# Patient Record
Sex: MALE | Race: BLACK OR AFRICAN AMERICAN | NOT HISPANIC OR LATINO | URBAN - METROPOLITAN AREA
[De-identification: names, ages, dates, MRNs, and addresses within clinical notes are randomized per-mention and may not be internally consistent; named-entity substitution may affect disease eponyms.]

---

## 2017-05-22 ENCOUNTER — INPATIENT (INPATIENT)
Facility: HOSPITAL | Age: 67
LOS: 2 days | Discharge: HOME | DRG: 313 | End: 2017-05-25
Attending: INTERNAL MEDICINE | Admitting: INTERNAL MEDICINE
Payer: COMMERCIAL

## 2017-05-22 VITALS
OXYGEN SATURATION: 99 % | TEMPERATURE: 98 F | HEART RATE: 68 BPM | DIASTOLIC BLOOD PRESSURE: 93 MMHG | SYSTOLIC BLOOD PRESSURE: 174 MMHG | RESPIRATION RATE: 18 BRPM

## 2017-05-22 DIAGNOSIS — R07.9 CHEST PAIN, UNSPECIFIED: ICD-10-CM

## 2017-05-22 LAB
ALBUMIN SERPL ELPH-MCNC: 4.2 G/DL — SIGNIFICANT CHANGE UP (ref 3.3–5)
ALP SERPL-CCNC: 77 U/L — SIGNIFICANT CHANGE UP (ref 40–120)
ALT FLD-CCNC: 15 U/L RC — SIGNIFICANT CHANGE UP (ref 10–45)
ANION GAP SERPL CALC-SCNC: 13 MMOL/L — SIGNIFICANT CHANGE UP (ref 5–17)
APTT BLD: 34.6 SEC — SIGNIFICANT CHANGE UP (ref 27.5–37.4)
AST SERPL-CCNC: 21 U/L — SIGNIFICANT CHANGE UP (ref 10–40)
BASOPHILS # BLD AUTO: 0 K/UL — SIGNIFICANT CHANGE UP (ref 0–0.2)
BILIRUB SERPL-MCNC: 0.9 MG/DL — SIGNIFICANT CHANGE UP (ref 0.2–1.2)
BUN SERPL-MCNC: 15 MG/DL — SIGNIFICANT CHANGE UP (ref 7–23)
CALCIUM SERPL-MCNC: 9.1 MG/DL — SIGNIFICANT CHANGE UP (ref 8.4–10.5)
CHLORIDE SERPL-SCNC: 102 MMOL/L — SIGNIFICANT CHANGE UP (ref 96–108)
CK MB BLD-MCNC: 0.6 % — SIGNIFICANT CHANGE UP (ref 0–3.5)
CK MB CFR SERPL CALC: 2.5 NG/ML — SIGNIFICANT CHANGE UP (ref 0–6.7)
CK SERPL-CCNC: 408 U/L — HIGH (ref 30–200)
CO2 SERPL-SCNC: 26 MMOL/L — SIGNIFICANT CHANGE UP (ref 22–31)
CREAT SERPL-MCNC: 1.16 MG/DL — SIGNIFICANT CHANGE UP (ref 0.5–1.3)
EOSINOPHIL # BLD AUTO: 0.3 K/UL — SIGNIFICANT CHANGE UP (ref 0–0.5)
GAS PNL BLDV: SIGNIFICANT CHANGE UP
GLUCOSE SERPL-MCNC: 98 MG/DL — SIGNIFICANT CHANGE UP (ref 70–99)
HCT VFR BLD CALC: 44 % — SIGNIFICANT CHANGE UP (ref 39–50)
HGB BLD-MCNC: 14.4 G/DL — SIGNIFICANT CHANGE UP (ref 13–17)
INR BLD: 1 RATIO — SIGNIFICANT CHANGE UP (ref 0.88–1.16)
LYMPHOCYTES # BLD AUTO: 2.2 K/UL — SIGNIFICANT CHANGE UP (ref 1–3.3)
LYMPHOCYTES # BLD AUTO: 33 % — SIGNIFICANT CHANGE UP (ref 13–44)
MAGNESIUM SERPL-MCNC: 2.1 MG/DL — SIGNIFICANT CHANGE UP (ref 1.6–2.6)
MCHC RBC-ENTMCNC: 29.7 PG — SIGNIFICANT CHANGE UP (ref 27–34)
MCHC RBC-ENTMCNC: 32.8 GM/DL — SIGNIFICANT CHANGE UP (ref 32–36)
MCV RBC AUTO: 90.5 FL — SIGNIFICANT CHANGE UP (ref 80–100)
MONOCYTES # BLD AUTO: 0.5 K/UL — SIGNIFICANT CHANGE UP (ref 0–0.9)
MONOCYTES NFR BLD AUTO: 9 % — SIGNIFICANT CHANGE UP (ref 2–14)
NEUTROPHILS # BLD AUTO: 1.6 K/UL — LOW (ref 1.8–7.4)
NEUTROPHILS NFR BLD AUTO: 58 % — SIGNIFICANT CHANGE UP (ref 43–77)
NT-PROBNP SERPL-SCNC: 51 PG/ML — SIGNIFICANT CHANGE UP (ref 0–300)
PHOSPHATE SERPL-MCNC: 3.3 MG/DL — SIGNIFICANT CHANGE UP (ref 2.5–4.5)
PLATELET # BLD AUTO: 212 K/UL — SIGNIFICANT CHANGE UP (ref 150–400)
POTASSIUM SERPL-MCNC: 4.1 MMOL/L — SIGNIFICANT CHANGE UP (ref 3.5–5.3)
POTASSIUM SERPL-SCNC: 4.1 MMOL/L — SIGNIFICANT CHANGE UP (ref 3.5–5.3)
PROT SERPL-MCNC: 7.7 G/DL — SIGNIFICANT CHANGE UP (ref 6–8.3)
PROTHROM AB SERPL-ACNC: 10.9 SEC — SIGNIFICANT CHANGE UP (ref 9.8–12.7)
RBC # BLD: 4.86 M/UL — SIGNIFICANT CHANGE UP (ref 4.2–5.8)
RBC # FLD: 12 % — SIGNIFICANT CHANGE UP (ref 10.3–14.5)
SODIUM SERPL-SCNC: 141 MMOL/L — SIGNIFICANT CHANGE UP (ref 135–145)
TROPONIN T SERPL-MCNC: <0.01 NG/ML — SIGNIFICANT CHANGE UP (ref 0–0.06)
WBC # BLD: 5.1 K/UL — SIGNIFICANT CHANGE UP (ref 3.8–10.5)
WBC # FLD AUTO: 5.1 K/UL — SIGNIFICANT CHANGE UP (ref 3.8–10.5)

## 2017-05-22 PROCEDURE — 71010: CPT | Mod: 26

## 2017-05-22 PROCEDURE — 99285 EMERGENCY DEPT VISIT HI MDM: CPT

## 2017-05-22 RX ORDER — ASPIRIN/CALCIUM CARB/MAGNESIUM 324 MG
324 TABLET ORAL ONCE
Qty: 0 | Refills: 0 | Status: COMPLETED | OUTPATIENT
Start: 2017-05-22 | End: 2017-05-22

## 2017-05-22 RX ADMIN — Medication 324 MILLIGRAM(S): at 21:00

## 2017-05-22 NOTE — ED PROVIDER NOTE - ATTENDING CONTRIBUTION TO CARE
Attending MD Burnett: I personally have seen and examined this patient.  Resident note reviewed and agree on plan of care and except where noted.  See below for details.     66M with PMH including HTN, HLD, colitis presents to the ED with chest pain.  Reports pain began about an hour prior to arrival and woke him from sleep.  Reports associated diaphoresis and mild shortness of breath.  Reports that he had been told he had an "abnormal EKG" last Friday by cardiologist and was sent for a stress which he has not had yet (scheduled this week).  Reports he woke up in a cold sweat.  Denies abdominal pain, nausea, vomiting, diarrhea, blood in stools. Denies dysuria, hematuria, change in urinary habits including frequency, urgency. Denies fevers, chills, dizziness, weakness, change in vision. On exam, head NCAT, PERRL, FROM at neck, no tenderness to palpation or stepoffs along length of spine, lungs CTAB with good inspiratory effort, +S1S2, no m/r/g, abdomen soft with +BS, NT, ND, no CVAT, moving all extremities with 5/5 strength bilateral upper and lower extremities, good and equal  strength bilaterally; Plan: Labs, EKG, CXR, admit

## 2017-05-22 NOTE — ED PROVIDER NOTE - OBJECTIVE STATEMENT
66yM h/o HTN, former smoker presents with 1 hour of substernal chest pressure, began while he was asleep, pain awoke him. Associated with diaphoresis and mild SOB. Not on daily ASA, did not take ASA prior to arrival. Last stress test >10 years ago. Reports seeing PMD last week for routine visit where reportedly had abnormal EKG, sent to cardiology follow-up (cannot recall name of cardiologist, not affiliated with [facility]) and recommended to schedule outpatient stress test this week.  PMD Javier Bose

## 2017-05-22 NOTE — ED ADULT NURSE NOTE - OBJECTIVE STATEMENT
Received patient awake and alert x 3, patient presents with non-radiating chest pain starting today, verbalized pain 8/10 on pain scale. Denies nausea, vomiting, dizziness. Per patient was awaken up by SOB and CP. Wife at bedside. Questions and concerns addressed, safety precaution maintained, will continue to monitor.

## 2017-05-23 DIAGNOSIS — R07.9 CHEST PAIN, UNSPECIFIED: ICD-10-CM

## 2017-05-23 DIAGNOSIS — I82.409 ACUTE EMBOLISM AND THROMBOSIS OF UNSPECIFIED DEEP VEINS OF UNSPECIFIED LOWER EXTREMITY: ICD-10-CM

## 2017-05-23 DIAGNOSIS — M67.911 UNSPECIFIED DISORDER OF SYNOVIUM AND TENDON, RIGHT SHOULDER: Chronic | ICD-10-CM

## 2017-05-23 DIAGNOSIS — I26.99 OTHER PULMONARY EMBOLISM WITHOUT ACUTE COR PULMONALE: ICD-10-CM

## 2017-05-23 DIAGNOSIS — Z98.890 OTHER SPECIFIED POSTPROCEDURAL STATES: Chronic | ICD-10-CM

## 2017-05-23 DIAGNOSIS — K52.9 NONINFECTIVE GASTROENTERITIS AND COLITIS, UNSPECIFIED: ICD-10-CM

## 2017-05-23 DIAGNOSIS — I10 ESSENTIAL (PRIMARY) HYPERTENSION: ICD-10-CM

## 2017-05-23 DIAGNOSIS — E78.5 HYPERLIPIDEMIA, UNSPECIFIED: ICD-10-CM

## 2017-05-23 LAB
ALBUMIN SERPL ELPH-MCNC: 3.6 G/DL — SIGNIFICANT CHANGE UP (ref 3.3–5)
ALP SERPL-CCNC: 64 U/L — SIGNIFICANT CHANGE UP (ref 40–120)
ALT FLD-CCNC: 14 U/L — SIGNIFICANT CHANGE UP (ref 10–45)
ANION GAP SERPL CALC-SCNC: 13 MMOL/L — SIGNIFICANT CHANGE UP (ref 5–17)
AST SERPL-CCNC: 23 U/L — SIGNIFICANT CHANGE UP (ref 10–40)
BASOPHILS # BLD AUTO: 0.01 K/UL — SIGNIFICANT CHANGE UP (ref 0–0.2)
BASOPHILS NFR BLD AUTO: 0.2 % — SIGNIFICANT CHANGE UP (ref 0–2)
BILIRUB SERPL-MCNC: 1 MG/DL — SIGNIFICANT CHANGE UP (ref 0.2–1.2)
BUN SERPL-MCNC: 10 MG/DL — SIGNIFICANT CHANGE UP (ref 7–23)
CALCIUM SERPL-MCNC: 9 MG/DL — SIGNIFICANT CHANGE UP (ref 8.4–10.5)
CHLORIDE SERPL-SCNC: 101 MMOL/L — SIGNIFICANT CHANGE UP (ref 96–108)
CHOLEST SERPL-MCNC: 154 MG/DL — SIGNIFICANT CHANGE UP (ref 10–199)
CK MB CFR SERPL CALC: 2.1 NG/ML — SIGNIFICANT CHANGE UP (ref 0–6.7)
CK SERPL-CCNC: 315 U/L — HIGH (ref 30–200)
CO2 SERPL-SCNC: 24 MMOL/L — SIGNIFICANT CHANGE UP (ref 22–31)
CREAT SERPL-MCNC: 1.23 MG/DL — SIGNIFICANT CHANGE UP (ref 0.5–1.3)
EOSINOPHIL # BLD AUTO: 0.26 K/UL — SIGNIFICANT CHANGE UP (ref 0–0.5)
EOSINOPHIL NFR BLD AUTO: 6.4 % — HIGH (ref 0–6)
GLUCOSE SERPL-MCNC: 91 MG/DL — SIGNIFICANT CHANGE UP (ref 70–99)
HBA1C BLD-MCNC: 6 % — HIGH (ref 4–5.6)
HCT VFR BLD CALC: 42 % — SIGNIFICANT CHANGE UP (ref 39–50)
HDLC SERPL-MCNC: 54 MG/DL — SIGNIFICANT CHANGE UP (ref 40–125)
HGB BLD-MCNC: 13.3 G/DL — SIGNIFICANT CHANGE UP (ref 13–17)
IMM GRANULOCYTES NFR BLD AUTO: 0 % — SIGNIFICANT CHANGE UP (ref 0–1.5)
LIPID PNL WITH DIRECT LDL SERPL: 83 MG/DL — SIGNIFICANT CHANGE UP
LYMPHOCYTES # BLD AUTO: 2.1 K/UL — SIGNIFICANT CHANGE UP (ref 1–3.3)
LYMPHOCYTES # BLD AUTO: 51.5 % — HIGH (ref 13–44)
MCHC RBC-ENTMCNC: 27.5 PG — SIGNIFICANT CHANGE UP (ref 27–34)
MCHC RBC-ENTMCNC: 31.7 GM/DL — LOW (ref 32–36)
MCV RBC AUTO: 86.8 FL — SIGNIFICANT CHANGE UP (ref 80–100)
MONOCYTES # BLD AUTO: 0.36 K/UL — SIGNIFICANT CHANGE UP (ref 0–0.9)
MONOCYTES NFR BLD AUTO: 8.8 % — SIGNIFICANT CHANGE UP (ref 2–14)
NEUTROPHILS # BLD AUTO: 1.35 K/UL — LOW (ref 1.8–7.4)
NEUTROPHILS NFR BLD AUTO: 33.1 % — LOW (ref 43–77)
PLATELET # BLD AUTO: 228 K/UL — SIGNIFICANT CHANGE UP (ref 150–400)
POTASSIUM SERPL-MCNC: 3.6 MMOL/L — SIGNIFICANT CHANGE UP (ref 3.5–5.3)
POTASSIUM SERPL-SCNC: 3.6 MMOL/L — SIGNIFICANT CHANGE UP (ref 3.5–5.3)
PROT SERPL-MCNC: 6.7 G/DL — SIGNIFICANT CHANGE UP (ref 6–8.3)
RBC # BLD: 4.84 M/UL — SIGNIFICANT CHANGE UP (ref 4.2–5.8)
RBC # FLD: 13.6 % — SIGNIFICANT CHANGE UP (ref 10.3–14.5)
SODIUM SERPL-SCNC: 138 MMOL/L — SIGNIFICANT CHANGE UP (ref 135–145)
TOTAL CHOLESTEROL/HDL RATIO MEASUREMENT: 2.9 RATIO — LOW (ref 3.4–9.6)
TRIGL SERPL-MCNC: 84 MG/DL — SIGNIFICANT CHANGE UP (ref 10–149)
TROPONIN T SERPL-MCNC: <0.01 NG/ML — SIGNIFICANT CHANGE UP (ref 0–0.06)
WBC # BLD: 4.08 K/UL — SIGNIFICANT CHANGE UP (ref 3.8–10.5)
WBC # FLD AUTO: 4.08 K/UL — SIGNIFICANT CHANGE UP (ref 3.8–10.5)

## 2017-05-23 PROCEDURE — 99223 1ST HOSP IP/OBS HIGH 75: CPT | Mod: AI

## 2017-05-23 RX ORDER — LORATADINE 10 MG/1
10 TABLET ORAL DAILY
Qty: 0 | Refills: 0 | Status: DISCONTINUED | OUTPATIENT
Start: 2017-05-23 | End: 2017-05-25

## 2017-05-23 RX ORDER — MESALAMINE 400 MG
2.4 TABLET, DELAYED RELEASE (ENTERIC COATED) ORAL DAILY
Qty: 0 | Refills: 0 | Status: DISCONTINUED | OUTPATIENT
Start: 2017-05-23 | End: 2017-05-25

## 2017-05-23 RX ORDER — ACETAMINOPHEN 500 MG
650 TABLET ORAL EVERY 6 HOURS
Qty: 0 | Refills: 0 | Status: DISCONTINUED | OUTPATIENT
Start: 2017-05-23 | End: 2017-05-25

## 2017-05-23 RX ORDER — DIPHENHYDRAMINE HCL 50 MG
25 CAPSULE ORAL ONCE
Qty: 0 | Refills: 0 | Status: COMPLETED | OUTPATIENT
Start: 2017-05-23 | End: 2017-05-23

## 2017-05-23 RX ORDER — CLOTRIMAZOLE AND BETAMETHASONE DIPROPIONATE 10; .5 MG/G; MG/G
1 CREAM TOPICAL
Qty: 0 | Refills: 0 | Status: DISCONTINUED | OUTPATIENT
Start: 2017-05-23 | End: 2017-05-23

## 2017-05-23 RX ORDER — AMLODIPINE BESYLATE 2.5 MG/1
10 TABLET ORAL DAILY
Qty: 0 | Refills: 0 | Status: DISCONTINUED | OUTPATIENT
Start: 2017-05-23 | End: 2017-05-25

## 2017-05-23 RX ORDER — CLOTRIMAZOLE AND BETAMETHASONE DIPROPIONATE 10; .5 MG/G; MG/G
1 CREAM TOPICAL
Qty: 0 | Refills: 0 | Status: DISCONTINUED | OUTPATIENT
Start: 2017-05-23 | End: 2017-05-25

## 2017-05-23 RX ORDER — ENOXAPARIN SODIUM 100 MG/ML
40 INJECTION SUBCUTANEOUS DAILY
Qty: 0 | Refills: 0 | Status: DISCONTINUED | OUTPATIENT
Start: 2017-05-23 | End: 2017-05-25

## 2017-05-23 RX ORDER — ATORVASTATIN CALCIUM 80 MG/1
40 TABLET, FILM COATED ORAL AT BEDTIME
Qty: 0 | Refills: 0 | Status: DISCONTINUED | OUTPATIENT
Start: 2017-05-23 | End: 2017-05-25

## 2017-05-23 RX ADMIN — Medication 25 MILLIGRAM(S): at 11:31

## 2017-05-23 RX ADMIN — ENOXAPARIN SODIUM 40 MILLIGRAM(S): 100 INJECTION SUBCUTANEOUS at 17:55

## 2017-05-23 RX ADMIN — AMLODIPINE BESYLATE 10 MILLIGRAM(S): 2.5 TABLET ORAL at 05:20

## 2017-05-23 RX ADMIN — Medication 2.4 GRAM(S): at 12:44

## 2017-05-23 RX ADMIN — LORATADINE 10 MILLIGRAM(S): 10 TABLET ORAL at 17:55

## 2017-05-23 RX ADMIN — CLOTRIMAZOLE AND BETAMETHASONE DIPROPIONATE 1 APPLICATION(S): 10; .5 CREAM TOPICAL at 12:50

## 2017-05-23 RX ADMIN — ATORVASTATIN CALCIUM 40 MILLIGRAM(S): 80 TABLET, FILM COATED ORAL at 21:22

## 2017-05-23 NOTE — H&P ADULT. - MUSCULOSKELETAL
details… detailed exam ROM intact/no joint swelling/no calf tenderness/no joint erythema/no joint warmth

## 2017-05-23 NOTE — H&P ADULT. - ASSESSMENT
66M w/pmh  HTN, HLD, Colitis,  former smoker,  p/w chest pain x one day. Negative cardiac enzymes, CPK elevated ,  CXR clear , EKG with nonspecific twave changes.

## 2017-05-23 NOTE — H&P ADULT. - FAMILY HISTORY
Father  Still living? Unknown  Family history of essential hypertension, Age at diagnosis: Age Unknown     Mother  Still living? Unknown  Family history of essential hypertension, Age at diagnosis: Age Unknown

## 2017-05-23 NOTE — H&P ADULT. - PROBLEM SELECTOR PLAN 1
will risk stratify , monitor enzymes and observe on telemetry   - monitor telemetry   - f/u A1c , lipid profile   - f/u serial cardiac enzymes   - continue ASA   -inpatient or next day stress if can be obtained

## 2017-05-23 NOTE — H&P ADULT. - HISTORY OF PRESENT ILLNESS
Patient is a 66 year old male with a past medical history significant for HTN, HLD, Colitis,  former smoker presents with 1 hour of substernal chest pain.   Patient reports he woke up from sleep with midsternal, constant, non radiating 8/10, pressure like chest pain . He reports associated diaphoresis and mild SOB. His symptoms did not worsen with exertion, and  improved after receiving aspirin in the emergency room. He report a recent runny nose, no cough and no fevers.  He reports intermittent lip swelling in the mornings for which he was seen recently by his PMD, and his losartan was discontinued. During that visit his EKG showed t wave changes for which he was seen by cardiology and referred to obtain a stress test which has not yet been scheduled.  He reports no palpitations, no leg edema, has normal exercise tolerance no GRIFFITHS  and no PND or orthopnea.  He has no recent travel and no sick contacts, no family history of cardiac disease.

## 2017-05-23 NOTE — H&P ADULT. - NEGATIVE CARDIOVASCULAR SYMPTOMS
no palpitations/no peripheral edema/no orthopnea/no claudication/no dyspnea on exertion/no paroxysmal nocturnal dyspnea

## 2017-05-24 LAB
ANION GAP SERPL CALC-SCNC: 12 MMOL/L — SIGNIFICANT CHANGE UP (ref 5–17)
BUN SERPL-MCNC: 14 MG/DL — SIGNIFICANT CHANGE UP (ref 7–23)
CALCIUM SERPL-MCNC: 9.3 MG/DL — SIGNIFICANT CHANGE UP (ref 8.4–10.5)
CHLORIDE SERPL-SCNC: 103 MMOL/L — SIGNIFICANT CHANGE UP (ref 96–108)
CO2 SERPL-SCNC: 26 MMOL/L — SIGNIFICANT CHANGE UP (ref 22–31)
CREAT SERPL-MCNC: 1.07 MG/DL — SIGNIFICANT CHANGE UP (ref 0.5–1.3)
D DIMER BLD IA.RAPID-MCNC: 433 NG/ML DDU — HIGH
GLUCOSE SERPL-MCNC: 91 MG/DL — SIGNIFICANT CHANGE UP (ref 70–99)
HCT VFR BLD CALC: 43.8 % — SIGNIFICANT CHANGE UP (ref 39–50)
HGB BLD-MCNC: 14.4 G/DL — SIGNIFICANT CHANGE UP (ref 13–17)
MCHC RBC-ENTMCNC: 29.5 PG — SIGNIFICANT CHANGE UP (ref 27–34)
MCHC RBC-ENTMCNC: 32.9 GM/DL — SIGNIFICANT CHANGE UP (ref 32–36)
MCV RBC AUTO: 89.6 FL — SIGNIFICANT CHANGE UP (ref 80–100)
PLATELET # BLD AUTO: 199 K/UL — SIGNIFICANT CHANGE UP (ref 150–400)
POTASSIUM SERPL-MCNC: 3.7 MMOL/L — SIGNIFICANT CHANGE UP (ref 3.5–5.3)
POTASSIUM SERPL-SCNC: 3.7 MMOL/L — SIGNIFICANT CHANGE UP (ref 3.5–5.3)
RBC # BLD: 4.88 M/UL — SIGNIFICANT CHANGE UP (ref 4.2–5.8)
RBC # FLD: 12 % — SIGNIFICANT CHANGE UP (ref 10.3–14.5)
SODIUM SERPL-SCNC: 141 MMOL/L — SIGNIFICANT CHANGE UP (ref 135–145)
WBC # BLD: 4 K/UL — SIGNIFICANT CHANGE UP (ref 3.8–10.5)
WBC # FLD AUTO: 4 K/UL — SIGNIFICANT CHANGE UP (ref 3.8–10.5)

## 2017-05-24 PROCEDURE — 71275 CT ANGIOGRAPHY CHEST: CPT | Mod: 26

## 2017-05-24 PROCEDURE — 93970 EXTREMITY STUDY: CPT | Mod: 26

## 2017-05-24 RX ADMIN — AMLODIPINE BESYLATE 10 MILLIGRAM(S): 2.5 TABLET ORAL at 05:57

## 2017-05-24 RX ADMIN — LORATADINE 10 MILLIGRAM(S): 10 TABLET ORAL at 14:23

## 2017-05-24 RX ADMIN — Medication 2.4 GRAM(S): at 14:23

## 2017-05-24 RX ADMIN — ATORVASTATIN CALCIUM 40 MILLIGRAM(S): 80 TABLET, FILM COATED ORAL at 21:42

## 2017-05-24 RX ADMIN — ENOXAPARIN SODIUM 40 MILLIGRAM(S): 100 INJECTION SUBCUTANEOUS at 14:23

## 2017-05-25 VITALS
OXYGEN SATURATION: 96 % | HEART RATE: 53 BPM | DIASTOLIC BLOOD PRESSURE: 84 MMHG | RESPIRATION RATE: 18 BRPM | SYSTOLIC BLOOD PRESSURE: 138 MMHG

## 2017-05-25 LAB
ANION GAP SERPL CALC-SCNC: 12 MMOL/L — SIGNIFICANT CHANGE UP (ref 5–17)
BUN SERPL-MCNC: 15 MG/DL — SIGNIFICANT CHANGE UP (ref 7–23)
CALCIUM SERPL-MCNC: 9.3 MG/DL — SIGNIFICANT CHANGE UP (ref 8.4–10.5)
CHLORIDE SERPL-SCNC: 100 MMOL/L — SIGNIFICANT CHANGE UP (ref 96–108)
CO2 SERPL-SCNC: 29 MMOL/L — SIGNIFICANT CHANGE UP (ref 22–31)
CREAT SERPL-MCNC: 1.17 MG/DL — SIGNIFICANT CHANGE UP (ref 0.5–1.3)
GLUCOSE SERPL-MCNC: 95 MG/DL — SIGNIFICANT CHANGE UP (ref 70–99)
POTASSIUM SERPL-MCNC: 3.5 MMOL/L — SIGNIFICANT CHANGE UP (ref 3.5–5.3)
POTASSIUM SERPL-SCNC: 3.5 MMOL/L — SIGNIFICANT CHANGE UP (ref 3.5–5.3)
SODIUM SERPL-SCNC: 141 MMOL/L — SIGNIFICANT CHANGE UP (ref 135–145)

## 2017-05-25 PROCEDURE — 93970 EXTREMITY STUDY: CPT

## 2017-05-25 PROCEDURE — 84132 ASSAY OF SERUM POTASSIUM: CPT

## 2017-05-25 PROCEDURE — 84295 ASSAY OF SERUM SODIUM: CPT

## 2017-05-25 PROCEDURE — 85730 THROMBOPLASTIN TIME PARTIAL: CPT

## 2017-05-25 PROCEDURE — 85027 COMPLETE CBC AUTOMATED: CPT

## 2017-05-25 PROCEDURE — 82330 ASSAY OF CALCIUM: CPT

## 2017-05-25 PROCEDURE — 85014 HEMATOCRIT: CPT

## 2017-05-25 PROCEDURE — 82803 BLOOD GASES ANY COMBINATION: CPT

## 2017-05-25 PROCEDURE — 83735 ASSAY OF MAGNESIUM: CPT

## 2017-05-25 PROCEDURE — 78452 HT MUSCLE IMAGE SPECT MULT: CPT | Mod: 26

## 2017-05-25 PROCEDURE — 80061 LIPID PANEL: CPT

## 2017-05-25 PROCEDURE — 82550 ASSAY OF CK (CPK): CPT

## 2017-05-25 PROCEDURE — 78452 HT MUSCLE IMAGE SPECT MULT: CPT

## 2017-05-25 PROCEDURE — 93018 CV STRESS TEST I&R ONLY: CPT

## 2017-05-25 PROCEDURE — 99285 EMERGENCY DEPT VISIT HI MDM: CPT | Mod: 25

## 2017-05-25 PROCEDURE — 93017 CV STRESS TEST TRACING ONLY: CPT

## 2017-05-25 PROCEDURE — 82435 ASSAY OF BLOOD CHLORIDE: CPT

## 2017-05-25 PROCEDURE — 80048 BASIC METABOLIC PNL TOTAL CA: CPT

## 2017-05-25 PROCEDURE — A9500: CPT

## 2017-05-25 PROCEDURE — 71275 CT ANGIOGRAPHY CHEST: CPT

## 2017-05-25 PROCEDURE — 82947 ASSAY GLUCOSE BLOOD QUANT: CPT

## 2017-05-25 PROCEDURE — 93016 CV STRESS TEST SUPVJ ONLY: CPT

## 2017-05-25 PROCEDURE — 83880 ASSAY OF NATRIURETIC PEPTIDE: CPT

## 2017-05-25 PROCEDURE — 83605 ASSAY OF LACTIC ACID: CPT

## 2017-05-25 PROCEDURE — 82553 CREATINE MB FRACTION: CPT

## 2017-05-25 PROCEDURE — 71045 X-RAY EXAM CHEST 1 VIEW: CPT

## 2017-05-25 PROCEDURE — 84100 ASSAY OF PHOSPHORUS: CPT

## 2017-05-25 PROCEDURE — 80053 COMPREHEN METABOLIC PANEL: CPT

## 2017-05-25 PROCEDURE — 85379 FIBRIN DEGRADATION QUANT: CPT

## 2017-05-25 PROCEDURE — 84484 ASSAY OF TROPONIN QUANT: CPT

## 2017-05-25 PROCEDURE — 85610 PROTHROMBIN TIME: CPT

## 2017-05-25 PROCEDURE — 83036 HEMOGLOBIN GLYCOSYLATED A1C: CPT

## 2017-05-25 RX ORDER — METOPROLOL TARTRATE 50 MG
1 TABLET ORAL
Qty: 30 | Refills: 0 | OUTPATIENT
Start: 2017-05-25 | End: 2017-06-24

## 2017-05-25 RX ORDER — CLOTRIMAZOLE AND BETAMETHASONE DIPROPIONATE 10; .5 MG/G; MG/G
1 CREAM TOPICAL
Qty: 0 | Refills: 0 | COMMUNITY
Start: 2017-05-25

## 2017-05-25 RX ORDER — MESALAMINE 400 MG
2 TABLET, DELAYED RELEASE (ENTERIC COATED) ORAL
Qty: 0 | Refills: 0 | COMMUNITY
Start: 2017-05-25

## 2017-05-25 RX ORDER — AMLODIPINE BESYLATE 2.5 MG/1
1 TABLET ORAL
Qty: 0 | Refills: 0 | COMMUNITY
Start: 2017-05-25

## 2017-05-25 RX ORDER — ASPIRIN/CALCIUM CARB/MAGNESIUM 324 MG
81 TABLET ORAL DAILY
Qty: 0 | Refills: 0 | Status: DISCONTINUED | OUTPATIENT
Start: 2017-05-25 | End: 2017-05-25

## 2017-05-25 RX ORDER — ASPIRIN/CALCIUM CARB/MAGNESIUM 324 MG
1 TABLET ORAL
Qty: 30 | Refills: 0 | OUTPATIENT
Start: 2017-05-25 | End: 2017-06-24

## 2017-05-25 RX ORDER — METOPROLOL TARTRATE 50 MG
25 TABLET ORAL DAILY
Qty: 0 | Refills: 0 | Status: DISCONTINUED | OUTPATIENT
Start: 2017-05-25 | End: 2017-05-25

## 2017-05-25 RX ORDER — ATORVASTATIN CALCIUM 80 MG/1
1 TABLET, FILM COATED ORAL
Qty: 0 | Refills: 0 | COMMUNITY
Start: 2017-05-25

## 2017-05-25 RX ADMIN — ENOXAPARIN SODIUM 40 MILLIGRAM(S): 100 INJECTION SUBCUTANEOUS at 15:06

## 2017-05-25 RX ADMIN — AMLODIPINE BESYLATE 10 MILLIGRAM(S): 2.5 TABLET ORAL at 06:00

## 2017-05-25 RX ADMIN — LORATADINE 10 MILLIGRAM(S): 10 TABLET ORAL at 15:06

## 2017-05-25 RX ADMIN — Medication 2.4 GRAM(S): at 15:06

## 2017-05-25 RX ADMIN — Medication 25 MILLIGRAM(S): at 18:32

## 2017-05-25 RX ADMIN — Medication 81 MILLIGRAM(S): at 18:39

## 2017-05-25 NOTE — DISCHARGE NOTE ADULT - CARE PROVIDER_API CALL
Jonas Ryder (DO), Internal Medicine  [address] [place] [address]  Phone: [phone]    Javire Carmona  Phone: (   )    -  Fax: (   )    -

## 2017-05-25 NOTE — DISCHARGE NOTE ADULT - PATIENT PORTAL LINK FT
“You can access the Follow[employer] Patient Portal, offered by [facility], by registering with the following website: [email or web address]”

## 2017-05-25 NOTE — DISCHARGE NOTE ADULT - PROVIDER TOKENS
TOKEN:'[id number]',FREE:[LAST:[DR Alexandra],FIRST:[Javier],PHONE:[(   )    -],FAX:[(   )    -],ADDRESS:[PCP]]

## 2017-05-25 NOTE — DISCHARGE NOTE ADULT - CARE PLAN
Principal Discharge DX:	Atypical chest pain  Secondary Diagnosis:	HTN (hypertension)  Secondary Diagnosis:	Colitis Principal Discharge DX:	Atypical chest pain  Goal:	symptoms resolved  Instructions for follow-up, activity and diet:	HOME CARE INSTRUCTIONS  For the next few days, avoid physical activities that bring on chest pain. Continue physical activities as directed.  Do not smoke.  Avoid drinking alcohol.   Only take over-the-counter or prescription medicine for pain, discomfort, or fever as directed by your caregiver.  Follow your caregiver's suggestions for further testing if your chest pain does not go away.  Keep any follow-up appointments you made. If you do not go to an appointment, you could develop lasting (chronic) problems with pain. If there is any problem keeping an appointment, you must call to reschedule.   SEEK MEDICAL CARE IF:  You think you are having problems from the medicine you are taking. Read your medicine instructions carefully.  Your chest pain does not go away, even after treatment.  You develop a rash with blisters on your chest.  SEEK IMMEDIATE MEDICAL CARE IF:  You have increased chest pain or pain that spreads to your arm, neck, jaw, back, or abdomen.   You develop shortness of breath, an increasing cough, or you are coughing up blood.  You have severe back or abdominal pain, feel nauseous, or vomit.  You develop severe weakness, fainting, or chills.  You have a fever.  THIS IS AN EMERGENCY. Do not wait to see if the pain will go away. Get medical help at once. Call your local emergency services (_____________________). Do not drive yourself to the hospital.  Secondary Diagnosis:	HTN (hypertension)  Instructions for follow-up, activity and diet:	Follow up with your medical doctor to establish long term blood pressure treatment goals.  Secondary Diagnosis:	Colitis  Instructions for follow-up, activity and diet:	c/rogers Carranza

## 2017-05-25 NOTE — DISCHARGE NOTE ADULT - MEDICATION SUMMARY - MEDICATIONS TO TAKE
I will START or STAY ON the medications listed below when I get home from the hospital:    mesalamine 1.2 g oral delayed release tablet  -- 2 tab(s) by mouth once a day  -- Indication: For Cholitis    aspirin 81 mg oral delayed release tablet  -- 1 tab(s) by mouth once a day  -- Indication: For Cad    atorvastatin 40 mg oral tablet  -- 1 tab(s) by mouth once a day (at bedtime)  -- Indication: For HLD (hyperlipidemia)    metoprolol succinate 25 mg oral tablet, extended release  -- 1 tab(s) by mouth once a day  -- Indication: For CAD    amLODIPine 10 mg oral tablet  -- 1 tab(s) by mouth once a day  -- Indication: For HTN (hypertension)    betamethasone-clotrimazole 0.05%-1% topical cream  -- 1 application on skin 2 times a day  -- Indication: For TOPICAL

## 2017-05-25 NOTE — DISCHARGE NOTE ADULT - PLAN OF CARE
HOME CARE INSTRUCTIONS  For the next few days, avoid physical activities that bring on chest pain. Continue physical activities as directed.  Do not smoke.  Avoid drinking alcohol.   Only take over-the-counter or prescription medicine for pain, discomfort, or fever as directed by your caregiver.  Follow your caregiver's suggestions for further testing if your chest pain does not go away.  Keep any follow-up appointments you made. If you do not go to an appointment, you could develop lasting (chronic) problems with pain. If there is any problem keeping an appointment, you must call to reschedule.   SEEK MEDICAL CARE IF:  You think you are having problems from the medicine you are taking. Read your medicine instructions carefully.  Your chest pain does not go away, even after treatment.  You develop a rash with blisters on your chest.  SEEK IMMEDIATE MEDICAL CARE IF:  You have increased chest pain or pain that spreads to your arm, neck, jaw, back, or abdomen.   You develop shortness of breath, an increasing cough, or you are coughing up blood.  You have severe back or abdominal pain, feel nauseous, or vomit.  You develop severe weakness, fainting, or chills.  You have a fever.  THIS IS AN EMERGENCY. Do not wait to see if the pain will go away. Get medical help at once. Call your local emergency services (_____________________). Do not drive yourself to the hospital. Follow up with your medical doctor to establish long term blood pressure treatment goals. symptoms resolved c/rogers Carranza

## 2017-05-25 NOTE — DISCHARGE NOTE ADULT - ADDITIONAL INSTRUCTIONS
please follow up with DR Ryder on 05/30/2017 at 0300 PM , please call the office tomorrow to confirm this appointment please follow up with DR Ryder on 05/30/2017 at 0300 PM , please call the office tomorrow to confirm this appointment  Please follow up with PCP DR Mao in 2 days of discharge

## 2017-05-25 NOTE — DISCHARGE NOTE ADULT - HOSPITAL COURSE
To be completed by attending Patient is a 66 year old male with a past medical history significant for HTN, HLD, Colitis,  former smoker presents with 1 hour of substernal chest pain.   Patient reports he woke up from sleep with midsternal, constant, non radiating 8/10, pressure like chest pain . He reports associated diaphoresis and mild SOB. His symptoms did not worsen with exertion, and  improved after receiving aspirin in the emergency room. He report a recent runny nose, no cough and no fevers.  He reports intermittent lip swelling in the mornings for which he was seen recently by his PMD, and his losartan was discontinued. During that visit his EKG showed t wave changes for which he was seen by cardiology and referred to obtain a stress test which has not yet been scheduled.  He reports no palpitations, no leg edema, has normal exercise tolerance no GRIFFITHS  and no PND or orthopnea.  He has no recent travel and no sick contacts, no family history of cardiac disease.       Dxed with Chest Pain S/P CTA and Stress Test with HTN,Colitis aaaaand HLD. Followed by Cardiology .   CTA showed IMPRESSION:   No pulmonary embolism.    Bibasilar subsegmental atelectasis.    Stress test GATED ANALYSIS:  Post-stress gated wall motion analysis was performed (LVEF  = 60 %;LVEDV = 109 ml.) revealing mildly reduced systolic  thickening of the mid anterolateral wall with normal  overall left ventricular ejection fraction.  ------------------------------------------------------------------------  IMPRESSIONS:Abnormal Study  * Chest Pain: No chest pain with administration of  Regadenoson.  * Symptom: Shortness of breath.  * HR Response: Appropriate.  * BP Response: Appropriate.  * Heart Rhythm: Sinus Bradycardia - 58 BPM.  * Baseline ECG: Nonspecific ST-T wave abnormality.  * ECG Changes: No significant ischemic ST segment changes  beyond baseline abnormalities.  * Arrhythmia: None.  * The left ventricle was normal in size. There is a small,  very mild defect in the mid to distal anterolateral wall  that is reversible suggestive of mild ischemia.  * There are also mild to moderate defects in the inferior,  inferolateral, inferoapical and inferoseptal walls that  are mostly fixed, partially correct with prone imaging,  and demonstrate preserved wall motion suggetsive of  attenuation artifact.  As these defects did not fully  correct with prone imaging, a small are a of scar in this  territory cannot be definitively excluded.  * Post-stress gated wall motion analysis was performed  (LVEF = 60 %;LVEDV = 109 ml.) revealing mildly reduced  systolic thickening of the mid anterolateral wall with  normal overall left ventricular ejection fraction.  *** No previous Nuclear/Stress exam.  ------------------------------------------------------------------------    Did well so was dcd home.

## 2017-11-01 RX ORDER — AMLODIPINE BESYLATE 2.5 MG/1
1 TABLET ORAL
Qty: 0 | Refills: 0 | COMMUNITY

## 2017-11-01 RX ORDER — MESALAMINE 400 MG
2 TABLET, DELAYED RELEASE (ENTERIC COATED) ORAL
Qty: 0 | Refills: 0 | COMMUNITY

## 2017-11-01 RX ORDER — ATORVASTATIN CALCIUM 80 MG/1
0 TABLET, FILM COATED ORAL
Qty: 0 | Refills: 0 | COMMUNITY

## 2018-12-06 NOTE — PATIENT PROFILE ADULT. - FUNCTIONAL SCREEN CURRENT LEVEL: AMBULATION, MLM
PATIENT:  Abdullahi Headley    YOB: 1957    DATE:  12/6/2018    PREOPERATIVE DIAGNOSIS:  Right ulnar shaft fracture    POSTOPERATIVE DIAGNOSIS: Same    PROCEDURE:  ORIF right ulnar shaft fracture    SURGEON:  Aguilar Santiago MD.    ASSISTANT:  None    ANESTHESIA:  CRNA: Lisa Zaidi CRNA                            Right supraclavicular block    ESTIMATED BLOOD LOSS:  Minimal    FINDINGS:  As above    IMPLANTS:    Implant Name Type Inv. Item Serial No.  Lot No. LRB No. Used   PLATE BN [id number] 7 HOLE LMT CNTCT TPR END 3.5 - SN/A Plate PLATE BN [id number] 7 HOLE LMT CNTCT TPR END 3.5 N/A [employer] ([place]) [id number] Right 1   SCREW BN 3.5MM 6MM 18MM 2.5MM FULL THRD SELF TAP - SN/A Screw SCREW BN 3.5MM 6MM 18MM 2.5MM FULL THRD SELF TAP N/A [employer] ([place]) N/A Right 3   SCREW BN 3.5MM 6MM 22MM 2.5MM FULL THRD SELF TAP - SN/A Screw SCREW BN 3.5MM 6MM 22MM 2.5MM FULL THRD SELF TAP N/A [employer] ([place]) N/A Right 3   SCREW BN 3.5MM 6MM 20MM 2.5MM FULL THRD SELF TAP - SN/A Screw SCREW BN 3.5MM 6MM 20MM 2.5MM FULL THRD SELF TAP N/A [employer]ES ([place]) N/A Right 1       SPECIMEN:  None    TOURNIQUET TIME:  250 mmHg X 46 minutes    COMPLICATIONS:  None    PRESENTATION:  Abdullahi Headley is a 60 year old male sustained a closed right ulnar shaft fracture when a calf crushed his arm between a metal fence and the animal.  He presents at this time for operative management.  Risks, benefits, alternatives, and complications of the procedure were all discussed with the patient preoperatively.    SUMMARY OF PROCEDURE: The patient was taken to the operating room and placed in the supine position. After the induction of right supraclavicular block, the right upper extremity was prepped and draped in usual sterile fashion.  A sterile dressing was placed on the right proximal arm and the arm was exsanguinated with gravity and an Ace wrap and the tourniquet inflated to 250 mmHg.  A 10 blade knife was used to make an incision over  the subcutaneous border of the ulna, centered at the palpable fracture.  This was extended down through the skin and subcutaneous tissue to the level of the fascia, which was opened in line with the skin incision.  This immediately revealed fracture and fracture hematoma.  Periosteum was peeled back 1 mm from the edge of the fracture both proximally and distally and then the flexor carpi ulnaris muscle was elevated off the volar cortex of the ulna, leaving the periosteum intact.  I used a total clamp reduction forceps to reduce the fracture to anatomic position.  A Synthes 7 hole 3.5 mm LC-DCP plate was then placed on the volar cortex of the ulna, centered over the fracture, and one distal and one proximal 3.5 mm position screw were placed.  Mini C-arm fluoroscopy was then used to confirm anatomic reduction of the fracture and good position of the plate and screws.  The next 2 holes in the proximal and then the next 2 holes in the distal aspect of the plate were placed, all under compression, compressing the fracture.  Finally, a 3.5 mm lag screw was placed through the central hole of the plate to engage the proximal fragment and compress the fracture through the plate construct.  There was full, smooth, forearm pronation and supination after replacing the hardware.  Mini C-arm fluoroscopy revealed good position of all hardware and maintenance of fracture reduction.  The wound was then copiously irrigated with normal saline solution and the fascia repaired with figure-of-eight 2-0 Vicryl sutures.  Subcutaneous tissue was closed with simple, buried, interrupted 2-0 Vicryl stitches, followed by a running 3-0 Monocryl subcuticular stitch in the skin with good apposition and then Steri-Strips.  The wound was dressed with Adaptic, sterile 4 x 4's, an ABD, and then a bulky dressing and sugar tong splint were placed followed by an Ace wrap.  The tourniquet was released with a total tourniquet time of 46 minutes and the  fingers regained physiologic in color immediately.  All sponge, needle, and instrument counts were reported as correct and the patient tolerated the procedure very well. There were no complications and the patient was transferred to the recovery room in stable condition.     (0) independent

## 2019-10-31 NOTE — ED PROVIDER NOTE - DISPOSITION TYPE
Pt is a 57 yr old who tripped over a rug while walking and holding a glass, fell, and landed on the glass, causing a deep laceration to the skin overlying his right clavicle and his forehead. Tetanus not up to date. Has had alcohol tonight. Did not lose consciousness when he hit his head. No other injuries, alert and oriented x 4 w a steady gait. No past medical history on file. No past surgical history on file. No family history on file. Social History     Socioeconomic History    Marital status: Not on file     Spouse name: Not on file    Number of children: Not on file    Years of education: Not on file    Highest education level: Not on file   Occupational History    Not on file   Social Needs    Financial resource strain: Not on file    Food insecurity:     Worry: Not on file     Inability: Not on file    Transportation needs:     Medical: Not on file     Non-medical: Not on file   Tobacco Use    Smoking status: Not on file   Substance and Sexual Activity    Alcohol use: Not on file    Drug use: Not on file    Sexual activity: Not on file   Lifestyle    Physical activity:     Days per week: Not on file     Minutes per session: Not on file    Stress: Not on file   Relationships    Social connections:     Talks on phone: Not on file     Gets together: Not on file     Attends Orthodox service: Not on file     Active member of club or organization: Not on file     Attends meetings of clubs or organizations: Not on file     Relationship status: Not on file    Intimate partner violence:     Fear of current or ex partner: Not on file     Emotionally abused: Not on file     Physically abused: Not on file     Forced sexual activity: Not on file   Other Topics Concern    Not on file   Social History Narrative    Not on file         ALLERGIES: Patient has no allergy information on record. Review of Systems   Constitutional: Negative for chills and fever.    Respiratory: Negative for shortness of breath. Cardiovascular: Negative for chest pain. Gastrointestinal: Negative for abdominal pain, constipation, diarrhea and vomiting. Skin: Positive for wound. Neurological: Positive for headaches. Negative for dizziness and light-headedness. All other systems reviewed and are negative. Vitals:    10/30/19 2200   BP: 142/90   Pulse: 92   Resp: 20   Temp: 98 °F (36.7 °C)   SpO2: 95%            Physical Exam   Constitutional: He is oriented to person, place, and time. He appears well-developed and well-nourished. HENT:   Head: Normocephalic and atraumatic. Eyes: No scleral icterus. Neck: Normal range of motion. Cardiovascular: Normal rate. Pulmonary/Chest: Effort normal and breath sounds normal. No respiratory distress. Abdominal: He exhibits no distension. Neurological: He is alert and oriented to person, place, and time. Skin: Skin is warm and dry. No rash noted. No erythema. 2 deep lacerations to skin overlying right clavicle, entire depth of wound explored, no vascular damage or foreign body, 4 cm laceration to forehead that is well approximated, bleeding of all three wounds is controlled    Nursing note and vitals reviewed. MDM  Number of Diagnoses or Management Options  Laceration of chest wall, right, initial encounter:   Laceration of scalp, initial encounter:   Diagnosis management comments: The patient presented after a fall. Lacerations repaired. Will give prophylactic antibiotics given the depth of the wound and the exposed clavicle. The patient is now resting comfortably and feels better, is alert and in no distress. The patient has a normal mental status and is neurologically intact. The history, exam, diagnostic testing (if any), and current condition do not demonstrate signs of clinically significant intra-cranial, intra-thoracic, intra-abdominal, or musculoskeletal trauma. The vital signs have been stable.  The patient's condition is stable and appropriate for discharge. The patient will pursue further outpatient evaluation with the primary care physician or other designated or consulting physician as indicated in the discharge instructions. Wound Repair  Date/Time: 10/30/2019 11:07 PM  Performed by: attendingPreparation: skin prepped with Shur-Clens  Location details: scalp  Wound length:2.6 - 7.5 cm  Anesthesia: local infiltration    Anesthesia:  Local Anesthetic: lidocaine 2% with epinephrine  Anesthetic total: 3 mL  Foreign bodies: no foreign bodies  Skin closure: Vicryl  Number of sutures: 4  Technique: simple  Approximation: close  Patient tolerance: Patient tolerated the procedure well with no immediate complications  My total time at bedside, performing this procedure was 1-15 minutes. Wound Closure by Adhesive  Date/Time: 10/30/2019 11:08 PM  Performed by: Patrice Chang MD  Authorized by: Patrice Chang MD     Consent:     Consent obtained:  Verbal    Consent given by:  Patient    Risks discussed:  Infection, pain, poor cosmetic result, retained foreign body and vascular damage  Anesthesia (see MAR for exact dosages): Anesthesia method:  Local infiltration    Local anesthetic:  Lidocaine 2% WITH epi  Laceration details:     Location:  Trunk    Trunk location:  R chest    Length (cm):  9    Depth (mm):  6  Repair type:     Repair type:   Intermediate  Exploration:     Hemostasis achieved with:  Direct pressure and epinephrine    Wound exploration: wound explored through full range of motion and entire depth of wound probed and visualized      Wound extent: no foreign bodies/material noted, no muscle damage noted, no underlying fracture noted and no vascular damage noted      Contaminated: no    Treatment:     Area cleansed with:  Saline    Amount of cleaning:  Extensive    Irrigation solution:  Sterile water    Irrigation volume:  1L    Irrigation method:  Pressure wash  Subcutaneous repair:     Suture size:  3-0 Suture material:  Vicryl    Suture technique:  Simple interrupted    Number of sutures:  4  Skin repair:     Repair method:  Sutures    Suture size:  4-0    Suture material:  Nylon    Suture technique:  Simple interrupted    Number of sutures:  17  Approximation:     Approximation:  Close  Post-procedure details:     Dressing:  Bulky dressing    Patient tolerance of procedure: Tolerated well, no immediate complications          The patient's results have been reviewed with them and/or available family. Patient and/or family verbally conveyed their understanding and agreement of the patient's signs, symptoms, diagnosis, treatment and prognosis and additionally agree to follow up as recommended in the discharge instructions or to return to the Emergency Room should their condition change prior to their follow-up appointment. The patient/family verbally agrees with the care-plan and verbally conveys that all of their questions have been answered. The discharge instructions have also been provided to the patient and/or family with some educational information regarding the patient's diagnosis as well a list of reasons why the patient would want to return to the ER prior to their follow-up appointment, should their condition change. ADMIT

## 2020-08-12 NOTE — ED PROVIDER NOTE - NS ED NOTE AC HIGH RISK COUNTRIES
EMERGENCY DEPARTMENT HISTORY AND PHYSICAL EXAM      Date: 8/12/2020  Patient Name: Tawanda Garcia    History of Presenting Illness     Chief Complaint   Patient presents with    Syncope     Pt reports last night she felt hot and tried to sit down and fell. Pt also reports feeling nauseous and lightheaded. Pt reports she had just eaten prior to. Pt reports this has happened freaquently lately. History Provided By: Patient    HPI: Tawanda Garcia, 26 y.o. female presents to the ED with cc of syncope and near syncope. Patient states that she felt hot last night and passed out. She hit her head at that time and also her right shoulder. Her headache is a 4 out of 10 in severity and her right shoulder pain is minimal.  She has full range of motion of the right upper extremity. She is nauseated and initially denied having chest pain. She states that she has had some shortness of breath and chest tightness. She denies a productive cough, but states she has a smoker's cough. She denies fever, chills, neck pain, abdominal pain, blood in stool or dark stools. She also denies heavy vaginal bleeding. She ate this morning and was at work when she felt lightheaded again. She sat down and asked her coworker to bring her to the emergency department. There are no other complaints, changes, or physical findings at this time. PCP: Kerrie Amaro MD    No current facility-administered medications on file prior to encounter. Current Outpatient Medications on File Prior to Encounter   Medication Sig Dispense Refill    ibuprofen (MOTRIN) 800 mg tablet Take 1 Tab by mouth every eight (8) hours. 30 Tab 1    acetaminophen (TYLENOL) 325 mg tablet Take 650 mg by mouth every four (4) hours as needed for Pain.  PNV No12-Iron-FA-DSS-OM-3 29 mg iron-1 mg -50 mg CPKD Take  by mouth.          Past History     Past Medical History:  Past Medical History:   Diagnosis Date    Asthma     Complication of No anesthesia     long time waking up       Past Surgical History:  Past Surgical History:   Procedure Laterality Date    HX GI      hiatal hernia    HX OTHER SURGICAL      hernia repair 2002       Family History:  Family History   Problem Relation Age of Onset    Diabetes Father     Thyroid Disease Father     Diabetes Maternal Grandmother     MS Paternal Grandmother        Social History:  Social History     Tobacco Use    Smoking status: Current Every Day Smoker     Packs/day: 0.50    Smokeless tobacco: Never Used   Substance Use Topics    Alcohol use: No     Comment: rare    Drug use: No       Allergies: Allergies   Allergen Reactions    Latex Rash    Coconut Anaphylaxis    Vancomycin Other (comments)     Patient stated her guts were on fire         Review of Systems   Review of Systems   Constitutional: Negative for fever. HENT: Negative for congestion. Eyes: Negative. Respiratory: Positive for chest tightness and shortness of breath. Cardiovascular: Negative for chest pain. Gastrointestinal: Positive for nausea. Negative for abdominal pain. Endocrine: Negative for heat intolerance. Genitourinary: Negative for dysuria. Musculoskeletal: Negative for back pain. Skin: Negative for rash. Allergic/Immunologic: Negative for immunocompromised state. Neurological: Positive for syncope, light-headedness and headaches. Hematological: Does not bruise/bleed easily. Psychiatric/Behavioral: Negative. All other systems reviewed and are negative. Physical Exam   Physical Exam  Vitals signs and nursing note reviewed. Constitutional:       General: She is not in acute distress. Appearance: She is well-developed. HENT:      Head: Normocephalic. Eyes:      Extraocular Movements: Extraocular movements intact. Pupils: Pupils are equal, round, and reactive to light. Neck:      Musculoskeletal: Normal range of motion and neck supple.    Cardiovascular:      Rate and Rhythm: Regular rhythm. Tachycardia present. Pulses: Normal pulses. Heart sounds: Normal heart sounds. Pulmonary:      Effort: Pulmonary effort is normal.      Breath sounds: Normal breath sounds. Chest:      Chest wall: Tenderness present. Abdominal:      General: Bowel sounds are normal.      Palpations: Abdomen is soft. Tenderness: There is no abdominal tenderness. Musculoskeletal: Normal range of motion. Skin:     General: Skin is warm and dry. Neurological:      General: No focal deficit present. Mental Status: She is alert and oriented to person, place, and time. Psychiatric:         Mood and Affect: Mood normal.         Behavior: Behavior normal.         Diagnostic Study Results     Labs -     Recent Results (from the past 12 hour(s))   GLUCOSE, POC    Collection Time: 08/12/20 10:37 AM   Result Value Ref Range    Glucose (POC) 127 (H) 65 - 100 mg/dL    Performed by Hillard Gosselin PCT    EKG, 12 LEAD, INITIAL    Collection Time: 08/12/20 10:41 AM   Result Value Ref Range    Ventricular Rate 117 BPM    Atrial Rate 117 BPM    P-R Interval 136 ms    QRS Duration 80 ms    Q-T Interval 304 ms    QTC Calculation (Bezet) 424 ms    Calculated P Axis 43 degrees    Calculated R Axis 64 degrees    Calculated T Axis 57 degrees    Diagnosis       Sinus tachycardia  When compared with ECG of 15-APR-2009 20:14,  Vent.  rate has increased BY  44 BPM     CBC WITH AUTOMATED DIFF    Collection Time: 08/12/20 11:27 AM   Result Value Ref Range    WBC 8.8 3.6 - 11.0 K/uL    RBC 4.84 3.80 - 5.20 M/uL    HGB 15.2 11.5 - 16.0 g/dL    HCT 42.3 35.0 - 47.0 %    MCV 87.4 80.0 - 99.0 FL    MCH 31.4 26.0 - 34.0 PG    MCHC 35.9 30.0 - 36.5 g/dL    RDW 13.1 11.5 - 14.5 %    PLATELET 904 981 - 845 K/uL    MPV 10.0 8.9 - 12.9 FL    NRBC 0.0 0  WBC    ABSOLUTE NRBC 0.00 0.00 - 0.01 K/uL    NEUTROPHILS 63 32 - 75 %    LYMPHOCYTES 28 12 - 49 %    MONOCYTES 8 5 - 13 %    EOSINOPHILS 1 0 - 7 %    BASOPHILS 0 0 - 1 %    IMMATURE GRANULOCYTES 0 0.0 - 0.5 %    ABS. NEUTROPHILS 5.5 1.8 - 8.0 K/UL    ABS. LYMPHOCYTES 2.4 0.8 - 3.5 K/UL    ABS. MONOCYTES 0.7 0.0 - 1.0 K/UL    ABS. EOSINOPHILS 0.1 0.0 - 0.4 K/UL    ABS. BASOPHILS 0.0 0.0 - 0.1 K/UL    ABS. IMM. GRANS. 0.0 0.00 - 0.04 K/UL    DF AUTOMATED     METABOLIC PANEL, COMPREHENSIVE    Collection Time: 08/12/20 11:27 AM   Result Value Ref Range    Sodium 138 136 - 145 mmol/L    Potassium 4.0 3.5 - 5.1 mmol/L    Chloride 105 97 - 108 mmol/L    CO2 27 21 - 32 mmol/L    Anion gap 6 5 - 15 mmol/L    Glucose 116 (H) 65 - 100 mg/dL    BUN 14 6 - 20 MG/DL    Creatinine 0.52 (L) 0.55 - 1.02 MG/DL    BUN/Creatinine ratio 27 (H) 12 - 20      GFR est AA >60 >60 ml/min/1.73m2    GFR est non-AA >60 >60 ml/min/1.73m2    Calcium 9.1 8.5 - 10.1 MG/DL    Bilirubin, total 0.5 0.2 - 1.0 MG/DL    ALT (SGPT) 63 12 - 78 U/L    AST (SGOT) 24 15 - 37 U/L    Alk. phosphatase 105 45 - 117 U/L    Protein, total 7.4 6.4 - 8.2 g/dL    Albumin 3.6 3.5 - 5.0 g/dL    Globulin 3.8 2.0 - 4.0 g/dL    A-G Ratio 0.9 (L) 1.1 - 2.2     HCG QL SERUM    Collection Time: 08/12/20 11:27 AM   Result Value Ref Range    HCG, Ql. Negative NEG     MAGNESIUM    Collection Time: 08/12/20 11:27 AM   Result Value Ref Range    Magnesium 1.7 1.6 - 2.4 mg/dL   TROPONIN I    Collection Time: 08/12/20 11:27 AM   Result Value Ref Range    Troponin-I, Qt. <0.05 <0.05 ng/mL   D DIMER    Collection Time: 08/12/20 11:27 AM   Result Value Ref Range    D-dimer 0.20 0.00 - 0.65 mg/L FEU       Radiologic Studies -   CT HEAD WO CONT   Final Result   IMPRESSION: No acute changes. XR CHEST PORT   Final Result   IMPRESSION: No acute cardiopulmonary process        CT Results  (Last 48 hours)    None        CXR Results  (Last 48 hours)    None          Medical Decision Making   I am the first provider for this patient.     I reviewed the vital signs, available nursing notes, past medical history, past surgical history, family history and social history. Vital Signs-Reviewed the patient's vital signs. Patient Vitals for the past 12 hrs:   Temp Pulse Resp BP SpO2   08/12/20 1117  (!) 106 28 136/82 98 %   08/12/20 1116  (!) 110 20 117/80 97 %   08/12/20 1115  (!) 112 18 122/78    08/12/20 1034 98.4 °F (36.9 °C) (!) 127 18 132/84 97 %       EKG interpretation: (Preliminary)  Rhythm: sinus tachycardia; and regular . Rate (approx.): 117; Axis: normal; FL interval: normal; QRS interval: normal ; ST/T wave: normal; Other findings: .    Records Reviewed: Nursing Notes, Old Medical Records, Previous electrocardiograms, Previous Radiology Studies and Previous Laboratory Studies    Provider Notes (Medical Decision Making):   Syncope, dehydration, electrolyte abnormality, anemia, arrhythmia, pulmonary embolism, pleurisy    ED Course:   Initial assessment performed. The patients presenting problems have been discussed, and they are in agreement with the care plan formulated and outlined with them. I have encouraged them to ask questions as they arise throughout their visit. Progress note:    Patient is feeling better. Her results were reviewed. She is advised to follow-up and return to ER if worse       Critical Care Time:   0    Disposition:  home    DISCHARGE PLAN:  1. Discharge Medication List as of 8/12/2020  1:44 PM        2. Follow-up Information     Follow up With Specialties Details Why Venkat Solis MD Cardiology Call today  [address]  [phone]      Rogers Waller MD Obstetrics & Gynecology, Gynecology, Obstetrics  As needed [address] [place] [address] EMERGENCY DEPT Emergency Medicine  If symptoms worsen [address]  [address]  [phone]        3. Return to ED if worse     Diagnosis     Clinical Impression:   1. Syncope and collapse    2.  SOB (shortness of breath) Attestations:    Swati Ortega MD    Please note that this dictation was completed with [employer], the computer voice recognition software. Quite often unanticipated grammatical, syntax, homophones, and other interpretive errors are inadvertently transcribed by the computer software. Please disregard these errors. Please excuse any errors that have escaped final proofreading. Thank you.

## 2021-05-27 NOTE — DISCHARGE NOTE ADULT - NS AS DC PROVIDER CONTACT Y/N MULTI
Referred by: Pcp Not In System; Medical Diagnosis (from order):    Diagnosis Information      Diagnosis    905.4 (ICD-9-CM) - S82.841S (ICD-10-CM) - Closed bimalleolar fracture of right ankle, sequela                Initial Evaluation    Visit:  1   Treatment Diagnosis: right: ankle symptoms with increased pain/symptoms, impaired range of motion, impaired strength, impaired gait  Next referring provider appointment: 6/1/2021    Date of onset: 3/24/2021  Date of surgery: 3/24/2021  Chart reviewed at time of initial evaluation (relevant co-morbidities, allergies, tests and medications listed): Patient denies other medical problems.  Diagnostic Tests: X-ray: reviewed.      SUBJECTIVE                                                                                                             Patient reports that she broke her right ankle March 24, 2021 when her dog ran into her leg. She had ORIF for right tibia/fibula fracture that day.  She has been immobilized since that time.  She expresses concern about ability to bear weight, a fear of falling, and difficulty negotiating stairs.  Pain / Symptoms:  Pain rating (out of 10): Current: 0 ; Best: 0; Worst: 4Location: Right lateral and medial ankle, right knee   Quality / Description: shooting.  Alleviating Factors: rest, avoiding movement in involved area.   Function:   Limitations / Exacerbation Factors: house/yard work, standing tasks, driving/riding in a vehicle, walking and standing, community distances, household distances, stairs  Prior Level of Function: declining function, therefore referred to therapy. no limitation in involved extremity. no assistive device,  Patient Goals: decreased pain and independence with ADLs/IADLs    Prior treatment: no therapies  Discharged from hospital, home health, or skilled nursing facility in last 30 days: no    Home Environment:   Patient lives with significant other  Type of home: multiple level home  Assistance available:  consistent  Feels safe at home/work/school.  2 or more falls or an unexplained fall with injury in the last year:  No    OBJECTIVE                                                                                                                     Observed Gait:   Weight bearing: Right: as tolerated     Range of Motion (ROM)   (degrees unless noted; active unless noted; norms in ( ); negative=lacking to 0, positive=beyond 0)    Ankle:    - Dorsiflexion (20):        • Left: 8        • Right: -8     - Plantar Flexion (40-50):        • Left: 66        • Right: 24    - Inversion (30-35):        • Left: 22        • Right: 9    - Eversion (15-20):        • Left: 9        • Right: 2    Strength  (out of 5 unless noted, standard test position unless noted, lbs tested with hand held dynamometer)   Ankle:    - Dorsiflexion:        • Left: 5        • Right: 4    - Inversion:        • Left: 5         • Right: 4-    - Eversion:        • Left: 5         • Right: 4-        Outcome Measures:   Lower Extremity Functional Scale: LEFS Calculated Total: 25 (0=extreme difficulty; 80=no difficulty) see flowsheet for additional documentation  TREATMENT                                                                                                                  Therapeutic Activity:  Passive range of motion right talocrural joint  Passive range of motion right subtalar joint  Posterior talar glide grade II    Neuromuscular Re-Education:  Ankle circles  Ankle alphabet  Gastroc stretch with towel 3x30  Weight shifting anterior posterior in parallel bars in staggered stance  Lateral weight shifting in parallel bars    Home Exercise Program: Access Code: [email or web address]  URL: [email or web address]  Date: 05/27/2021  Prepared by: David Sanders    Exercises  Seated Ankle Alphabet - 3-6 x daily - 7 x weekly - 1 sets - 1 reps  Seated Ankle Circles - 3-6 x daily - 7 x weekly - 1 sets - 10 reps  Long Sitting Calf Stretch with  Strap - 3 x daily - 7 x weekly - 1 sets - 3 reps - 30 hold       ASSESSMENT                                                                                                           58 year old female patient has reported functional limitations listed above impacted by signs and symptoms consistent with below   • Involved:       - right ankle   • Symptoms/impairments: increased pain/symptoms, impaired range of motion, impaired strength and impaired gait  Patient reports having open reduction internal fixation for right tibia and fibular fractures sustained on March 24 when her dog ran into her leg.  She has been immobilized since that time.  She presents with impaired right ankle and subtalar joint mobility, motor function, range of motion, and muscle performance associated with late effects of surgery and immobilization.  Her range of motion is quite limited at evaluation.  She expresses a fear of falling and concerns about safely resuming normal activity.    Prognosis: patient will benefit from skilled therapy  Rehabilitative potential is: good  Predicted patient presentation: Low (stable) - Patient comorbidities and complexities, as defined above, will have little effect on progress for prescribed plan of care.  Patient Education:   Who will be receiving education: patient  Are they ready to learn: yes  Preferred learning style: written, verbal, demonstration and video  Barriers to learning: no barriers apparent at this time  Results of above outlined education: Verbalizes understanding and Demonstrates understanding      PLAN                                                                                                                         The following skilled interventions to be implemented to achieve goals listed below:  Gait Training (55468)  Manual Therapy (27783)  Neuromuscular Re-Education (10888)  Therapeutic Activity (67378)  Therapeutic Exercise (55062)    Frequency / Duration: 2 times per week  tapering as patient progresses for an estimated total of 12 visits for 6 weeks    Patient involved in and agreed to plan of care and goals.  Patient given attendance policy at time of initial evaluation.    GOALS                                                                                                                           Long Term Goals: to be met by end of plan of care  1. Lower Extremity Functional Scale: Patient will complete form to reflect an improved raw score to greater than or equal to 43/80 to indicate patient reported improvement in function/disability/impairment (minimal detectable change: 9 points).  2. Patient will demonstrate ability to negotiate level and unlevel surfaces at variable velocities, including change of direction without increased pain or instability to return to age appropriate and community activities at prior level of function.  3. Patient will ascend and descend using reciprocal pattern for safe home access      Therapy procedure time and total treatment time can be found documented on the Time Entry flowsheet   Yes

## 2025-07-12 ENCOUNTER — EMERGENCY (EMERGENCY)
Facility: HOSPITAL | Age: 75
LOS: 0 days | Discharge: HOME | End: 2025-07-12
Attending: STUDENT IN AN ORGANIZED HEALTH CARE EDUCATION/TRAINING PROGRAM
Payer: MEDICARE

## 2025-07-12 VITALS
HEART RATE: 53 BPM | RESPIRATION RATE: 18 BRPM | TEMPERATURE: 98 F | SYSTOLIC BLOOD PRESSURE: 131 MMHG | OXYGEN SATURATION: 100 % | DIASTOLIC BLOOD PRESSURE: 77 MMHG

## 2025-07-12 VITALS
DIASTOLIC BLOOD PRESSURE: 80 MMHG | TEMPERATURE: 98 F | RESPIRATION RATE: 19 BRPM | HEIGHT: 71 IN | SYSTOLIC BLOOD PRESSURE: 147 MMHG | OXYGEN SATURATION: 98 % | WEIGHT: 166.01 LBS | HEART RATE: 52 BPM

## 2025-07-12 DIAGNOSIS — Z87.39 PERSONAL HISTORY OF OTHER DISEASES OF THE MUSCULOSKELETAL SYSTEM AND CONNECTIVE TISSUE: ICD-10-CM

## 2025-07-12 DIAGNOSIS — I10 ESSENTIAL (PRIMARY) HYPERTENSION: ICD-10-CM

## 2025-07-12 DIAGNOSIS — R00.1 BRADYCARDIA, UNSPECIFIED: ICD-10-CM

## 2025-07-12 DIAGNOSIS — M67.911 UNSPECIFIED DISORDER OF SYNOVIUM AND TENDON, RIGHT SHOULDER: Chronic | ICD-10-CM

## 2025-07-12 DIAGNOSIS — E78.5 HYPERLIPIDEMIA, UNSPECIFIED: ICD-10-CM

## 2025-07-12 DIAGNOSIS — R42 DIZZINESS AND GIDDINESS: ICD-10-CM

## 2025-07-12 DIAGNOSIS — R11.0 NAUSEA: ICD-10-CM

## 2025-07-12 DIAGNOSIS — Z98.890 OTHER SPECIFIED POSTPROCEDURAL STATES: Chronic | ICD-10-CM

## 2025-07-12 DIAGNOSIS — R53.1 WEAKNESS: ICD-10-CM

## 2025-07-12 DIAGNOSIS — R10.9 UNSPECIFIED ABDOMINAL PAIN: ICD-10-CM

## 2025-07-12 DIAGNOSIS — Z87.19 PERSONAL HISTORY OF OTHER DISEASES OF THE DIGESTIVE SYSTEM: ICD-10-CM

## 2025-07-12 PROBLEM — K52.9 NONINFECTIVE GASTROENTERITIS AND COLITIS, UNSPECIFIED: Chronic | Status: ACTIVE | Noted: 2017-05-23

## 2025-07-12 LAB
ALBUMIN SERPL ELPH-MCNC: 3.5 G/DL — SIGNIFICANT CHANGE UP (ref 3.3–5)
ALP SERPL-CCNC: 84 U/L — SIGNIFICANT CHANGE UP (ref 40–120)
ALT FLD-CCNC: 24 U/L — SIGNIFICANT CHANGE UP (ref 12–78)
ANION GAP SERPL CALC-SCNC: 8 MMOL/L — SIGNIFICANT CHANGE UP (ref 5–17)
AST SERPL-CCNC: 18 U/L — SIGNIFICANT CHANGE UP (ref 15–37)
BASOPHILS # BLD AUTO: 0.01 K/UL — SIGNIFICANT CHANGE UP (ref 0–0.2)
BASOPHILS NFR BLD AUTO: 0.1 % — SIGNIFICANT CHANGE UP (ref 0–2)
BILIRUB SERPL-MCNC: 1.4 MG/DL — HIGH (ref 0.2–1.2)
BUN SERPL-MCNC: 11 MG/DL — SIGNIFICANT CHANGE UP (ref 7–23)
CALCIUM SERPL-MCNC: 9 MG/DL — SIGNIFICANT CHANGE UP (ref 8.5–10.1)
CHLORIDE SERPL-SCNC: 101 MMOL/L — SIGNIFICANT CHANGE UP (ref 96–108)
CO2 SERPL-SCNC: 30 MMOL/L — SIGNIFICANT CHANGE UP (ref 22–31)
CREAT SERPL-MCNC: 1.11 MG/DL — SIGNIFICANT CHANGE UP (ref 0.5–1.3)
EGFR: 69 ML/MIN/1.73M2 — SIGNIFICANT CHANGE UP
EGFR: 69 ML/MIN/1.73M2 — SIGNIFICANT CHANGE UP
EOSINOPHIL # BLD AUTO: 0.05 K/UL — SIGNIFICANT CHANGE UP (ref 0–0.5)
EOSINOPHIL NFR BLD AUTO: 0.7 % — SIGNIFICANT CHANGE UP (ref 0–6)
GLUCOSE SERPL-MCNC: 107 MG/DL — HIGH (ref 70–99)
HCT VFR BLD CALC: 41.7 % — SIGNIFICANT CHANGE UP (ref 39–50)
HGB BLD-MCNC: 13.7 G/DL — SIGNIFICANT CHANGE UP (ref 13–17)
IMM GRANULOCYTES NFR BLD AUTO: 0.4 % — SIGNIFICANT CHANGE UP (ref 0–0.9)
LIDOCAIN IGE QN: 18 U/L — SIGNIFICANT CHANGE UP (ref 13–75)
LYMPHOCYTES # BLD AUTO: 1.3 K/UL — SIGNIFICANT CHANGE UP (ref 1–3.3)
LYMPHOCYTES # BLD AUTO: 17.3 % — SIGNIFICANT CHANGE UP (ref 13–44)
MAGNESIUM SERPL-MCNC: 2 MG/DL — SIGNIFICANT CHANGE UP (ref 1.6–2.6)
MCHC RBC-ENTMCNC: 28.4 PG — SIGNIFICANT CHANGE UP (ref 27–34)
MCHC RBC-ENTMCNC: 32.9 G/DL — SIGNIFICANT CHANGE UP (ref 32–36)
MCV RBC AUTO: 86.3 FL — SIGNIFICANT CHANGE UP (ref 80–100)
MONOCYTES # BLD AUTO: 0.84 K/UL — SIGNIFICANT CHANGE UP (ref 0–0.9)
MONOCYTES NFR BLD AUTO: 11.2 % — SIGNIFICANT CHANGE UP (ref 2–14)
NEUTROPHILS # BLD AUTO: 5.27 K/UL — SIGNIFICANT CHANGE UP (ref 1.8–7.4)
NEUTROPHILS NFR BLD AUTO: 70.3 % — SIGNIFICANT CHANGE UP (ref 43–77)
NRBC BLD AUTO-RTO: 0 /100 WBCS — SIGNIFICANT CHANGE UP (ref 0–0)
PLATELET # BLD AUTO: 260 K/UL — SIGNIFICANT CHANGE UP (ref 150–400)
POTASSIUM SERPL-MCNC: 3.4 MMOL/L — LOW (ref 3.5–5.3)
POTASSIUM SERPL-SCNC: 3.4 MMOL/L — LOW (ref 3.5–5.3)
PROT SERPL-MCNC: 7.6 GM/DL — SIGNIFICANT CHANGE UP (ref 6–8.3)
RBC # BLD: 4.83 M/UL — SIGNIFICANT CHANGE UP (ref 4.2–5.8)
RBC # FLD: 13.5 % — SIGNIFICANT CHANGE UP (ref 10.3–14.5)
SODIUM SERPL-SCNC: 139 MMOL/L — SIGNIFICANT CHANGE UP (ref 135–145)
TROPONIN I, HIGH SENSITIVITY RESULT: 21.9 NG/L — SIGNIFICANT CHANGE UP
WBC # BLD: 7.5 K/UL — SIGNIFICANT CHANGE UP (ref 3.8–10.5)
WBC # FLD AUTO: 7.5 K/UL — SIGNIFICANT CHANGE UP (ref 3.8–10.5)

## 2025-07-12 PROCEDURE — 99285 EMERGENCY DEPT VISIT HI MDM: CPT

## 2025-07-12 PROCEDURE — 71045 X-RAY EXAM CHEST 1 VIEW: CPT | Mod: 26

## 2025-07-12 PROCEDURE — 93010 ELECTROCARDIOGRAM REPORT: CPT

## 2025-07-12 RX ORDER — ONDANSETRON HCL/PF 4 MG/2 ML
4 VIAL (ML) INJECTION ONCE
Refills: 0 | Status: COMPLETED | OUTPATIENT
Start: 2025-07-12 | End: 2025-07-12

## 2025-07-12 RX ADMIN — Medication 20 MILLIGRAM(S): at 11:28

## 2025-07-12 RX ADMIN — Medication 1000 MILLILITER(S): at 11:29

## 2025-07-12 RX ADMIN — Medication 4 MILLIGRAM(S): at 11:26
